# Patient Record
Sex: MALE | Race: WHITE
[De-identification: names, ages, dates, MRNs, and addresses within clinical notes are randomized per-mention and may not be internally consistent; named-entity substitution may affect disease eponyms.]

---

## 2019-09-14 ENCOUNTER — HOSPITAL ENCOUNTER (EMERGENCY)
Dept: HOSPITAL 97 - ER | Age: 54
Discharge: HOME | End: 2019-09-14
Payer: SELF-PAY

## 2019-09-14 VITALS — DIASTOLIC BLOOD PRESSURE: 82 MMHG | SYSTOLIC BLOOD PRESSURE: 119 MMHG

## 2019-09-14 VITALS — OXYGEN SATURATION: 98 %

## 2019-09-14 VITALS — TEMPERATURE: 99.6 F

## 2019-09-14 DIAGNOSIS — J40: Primary | ICD-10-CM

## 2019-09-14 PROCEDURE — 99284 EMERGENCY DEPT VISIT MOD MDM: CPT

## 2019-09-14 PROCEDURE — 71046 X-RAY EXAM CHEST 2 VIEWS: CPT

## 2019-09-14 PROCEDURE — 87804 INFLUENZA ASSAY W/OPTIC: CPT

## 2019-09-14 NOTE — ER
Nurse's Notes                                                                                     

 [facility]                                                                 

Name: Demarco Monroy                                                                            

Age: 54 yrs                                                                                       

Sex: Male                                                                                         

: 1965                                                                                   

MRN: [id number]                                                                                   

Arrival Date: 2019                                                                          

Time: 09:01                                                                                       

Account#: [id number]                                                                            

Bed 7                                                                                             

Private MD:                                                                                       

Diagnosis: Bronchitis, not specified as acute or chronic                                          

                                                                                                  

Presentation:                                                                                     

                                                                                             

09:08 Presenting complaint: Patient states: Nonproductive cough, SOB, headache, and pain with hb  

      cough x 1 week. Transition of care: patient was not received from another setting of        

      care. Onset of symptoms was 2019. Risk Assessment: Do you want to hurt        

      yourself or someone else? Patient reports no desire to harm self or others. Initial         

      Sepsis Screen: Does the patient meet any 2 criteria? No. Patient's initial sepsis           

      screen is negative. Does the patient have a suspected source of infection? No.              

      Patient's initial sepsis screen is negative. Care prior to arrival: None.                   

09:08 Method Of Arrival: Ambulatory                                                           hb  

09:08 Acuity: JUDIT 3                                                                           hb  

                                                                                                  

Historical:                                                                                       

- Allergies:                                                                                      

:14 No Known Allergies;                                                                     hb  

- Home Meds:                                                                                      

:14 None [Active];                                                                          hb  

- PMHx:                                                                                           

09:14 None;                                                                                   hb  

                                                                                                  

- Immunization history:: Adult Immunizations.                                                     

- Social history:: Smoking status: .                                                              

- Ebola Screening: : Patient denies travel to an Ebola-affected area in the 21 days               

  before illness onset.                                                                           

                                                                                                  

                                                                                                  

Screenin:04 Abuse screen: Denies threats or abuse. Nutritional screening: No deficits noted.        tw2 

      Tuberculosis screening: No symptoms or risk factors identified. Fall Risk None              

      identified.                                                                                 

                                                                                                  

Assessment:                                                                                       

09:05 General: Appears in no apparent distress. Behavior is calm, cooperative, appropriate    tw2 

      for age. Pain: Denies pain. Neuro: Level of Consciousness is awake, alert, obeys            

      commands, Oriented to person, place, time, situation. Cardiovascular: Heart tones S1 S2     

      Patient's skin is warm and dry. Respiratory: Reports cough that is non-productive,          

      Airway is patent Respiratory effort is even, unlabored, Respiratory pattern is regular,     

      symmetrical, Breath sounds are clear bilaterally. GI: No signs and/or symptoms were         

      reported involving the gastrointestinal system. Abdomen is round non-distended, Bowel       

      sounds present X 4 quads. : No signs and/or symptoms were reported regarding the          

      genitourinary system. EENT: No signs and/or symptoms were reported regarding the EENT       

      system. Derm: No signs and/or symptoms reported regarding the dermatologic system.          

      Musculoskeletal: Range of motion: intact in all extremities.                                

09:14 Reassessment: provider at bedside at this time.                                         tw2 

10:27 Reassessment: Patient appears in no apparent distress at this time. No changes from     tw2 

      previously documented assessment. Patient and/or family updated on plan of care and         

      expected duration. Pain level reassessed. Patient is alert, oriented x 3, equal             

      unlabored respirations, skin warm/dry/pink.                                                 

                                                                                                  

Vital Signs:                                                                                      

09:02  / 76; Pulse 78; Resp 18; Temp 99.6(TE); Pulse Ox 95% on R/A; Weight 113.4 kg;    hb  

      Height 5 ft. 10 in. (177.80 cm); Pain 5/10;                                                 

10:27 Resp 18; Pulse Ox 98% on R/A;                                                           tw2 

10:41  / 82; Pulse 89; Resp 16; Pulse Ox 98% ;                                          sv  

09:02 Body Mass Index 35.87 (113.40 kg, 177.80 cm)                                            hb  

                                                                                                  

ED Course:                                                                                        

09:01 Patient arrived in ED.                                                                  rg4 

09:03 Aleksandar Raymundo NP is PHCP.                                                           pm1 

09:03 Nghia Church MD is Attending Physician.                                              pm1 

09:04 Stefania Benson, LUIZ is Primary Nurse.                                                        tw2 

09:04 Bed in low position. Call light in reach. Adult w/ patient.                             tw2 

09:04 Arm band placed on.                                                                     tw2 

09:14 Triage completed.                                                                       hb  

09:45 Flu Sent.                                                                               tw2 

10:19 Chest Pa And Lat (2 Views) XRAY In Process Unspecified.                                 EDMS

11:00 Awaiting: consultation with provider regarding results and diagnoses at this time.      tw2 

11:00 No provider procedures requiring assistance completed. Patient did not have IV access   tw2 

      during this emergency room visit.                                                           

                                                                                                  

Administered Medications:                                                                         

09:42 Drug: Tussionex Pennkinetic ER 5 ml Route: PO;                                          tw2 

10:36 Follow up: Response: No adverse reaction; Marked relief of symptoms                     tw2 

09:44 Drug: Albuterol 2.5 mg Route: Inhalation;                                               tw2 

10:36 Follow up: Response: No adverse reaction                                                tw2 

11:00 Drug: predniSONE 60 mg Route: PO;                                                       tw2 

11:06 Follow up: Response: No adverse reaction                                                tw2 

                                                                                                  

                                                                                                  

Outcome:                                                                                          

10:51 Discharge ordered by MD.                                                                pm1 

11:00 Discharged to home ambulatory, with significant other.                                  tw2 

11:00 Condition: stable                                                                           

11:00 Discharge instructions given to patient, significant other, Instructed on discharge         

      instructions, follow up and referral plans. medication usage, Demonstrated                  

      understanding of instructions, follow-up care, medications, Prescriptions given X 3.        

11:05 Patient left the ED.                                                                    2 

                                                                                                  

Signatures:                                                                                       

Dispatcher [employer]                           EDMS                                                 

Trudi Garcia RN                    RN   Aleksandar Vinson, NP                    NP   pm1                                                  

Destiny Johnson RN                     RN   Stefania Fernández RN                          RN   tw2                                                  

[place] Connell                                 rg4                                                  

                                                                                                  

**************************************************************************************************

## 2019-09-14 NOTE — RAD REPORT
EXAM DESCRIPTION:  Agusto Ham And Lat (2 Views)9/14/2019 10:18 am

 

CLINICAL HISTORY:  Cough

 

COMPARISON:  None

 

FINDINGS:   Areas of subsegmental atelectasis are present within the left lung base. Remainder lungs 
appear clear. Heart is normal size

## 2019-09-14 NOTE — EDPHYS
Physician Documentation                                                                           

 [facility]                                                                 

Name: Demarco Monroy                                                                            

Age: 54 yrs                                                                                       

Sex: Male                                                                                         

: 1965                                                                                   

MRN: [id number]                                                                                   

Arrival Date: 2019                                                                          

Time: 09:01                                                                                       

Account#: [id number]                                                                            

Bed 7                                                                                             

Private MD:                                                                                       

ED Physician Nghia Church                                                                       

HPI:                                                                                              

                                                                                             

09:44 This 54 yrs old  Male presents to ER via Ambulatory with complaints of Cough.  pm1 

09:44 The patient or guardian reports cough. Onset: The symptoms/episode began/occurred 7     pm1 

      day(s) ago. Severity of symptoms: in the emergency department the symptoms are actually     

      worse. Modifying factors: The symptoms are alleviated by nothing, the symptoms are          

      aggravated by nothing. Associated signs and symptoms: Pertinent positives: wheezing,        

      Pertinent negatives: chest pain, fever, sore throat. The patient has experienced a          

      previous episode, approximately 1 years ago. The patient has not recently seen a            

      physician.                                                                                  

                                                                                                  

Historical:                                                                                       

- Allergies:                                                                                      

09:14 No Known Allergies;                                                                     hb  

- Home Meds:                                                                                      

: None [Active];                                                                          hb  

- PMHx:                                                                                           

:14 None;                                                                                   hb  

                                                                                                  

- Immunization history:: Adult Immunizations.                                                     

- Social history:: Smoking status: .                                                              

- Ebola Screening: : Patient denies travel to an Ebola-affected area in the 21 days               

  before illness onset.                                                                           

                                                                                                  

                                                                                                  

ROS:                                                                                              

09:44 Constitutional: Negative for fever, chills, and weight loss, Eyes: Negative for injury, pm1 

      pain, redness, and discharge, ENT: Negative for injury, pain, and discharge, Neck:          

      Negative for injury, pain, and swelling, Cardiovascular: Negative for chest pain,           

      palpitations, and edema.                                                                    

09:44 Abdomen/GI: Negative for abdominal pain, nausea, vomiting, diarrhea, and constipation,      

      Back: Negative for injury and pain, MS/Extremity: Negative for injury and deformity,        

      Skin: Negative for injury, rash, and discoloration, Neuro: Negative for headache,           

      weakness, numbness, tingling, and seizure.                                                  

09:44 Respiratory: Positive for cough, Negative for shortness of breath, sputum production,       

      wheezing.                                                                                   

                                                                                                  

Exam:                                                                                             

09:44 Constitutional:  This is a well developed, well nourished patient who is awake, alert,  pm1 

      and in no acute distress. Head/Face:  Normocephalic, atraumatic. Neck:  Trachea             

      midline, no thyromegaly or masses palpated, and no cervical lymphadenopathy.  Supple,       

      full range of motion without nuchal rigidity, or vertebral point tenderness.  No            

      Meningismus. Chest/axilla:  Normal chest wall appearance and motion.  Nontender with no     

      deformity.  No lesions are appreciated. Cardiovascular:  Regular rate and rhythm with a     

      normal S1 and S2.  No gallops, murmurs, or rubs.  Normal PMI, no JVD.  No pulse             

      deficits.                                                                                   

09:44 Abdomen/GI:  Soft, non-tender, with normal bowel sounds.  No distension or tympany.  No     

      guarding or rebound.  No evidence of tenderness throughout. Back:  No spinal                

      tenderness.  No costovertebral tenderness.  Full range of motion. Skin:  Warm, dry with     

      normal turgor.  Normal color with no rashes, no lesions, and no evidence of cellulitis.     

      MS/ Extremity:  Pulses equal, no cyanosis.  Neurovascular intact.  Full, normal range       

      of motion.                                                                                  

09:44 Respiratory: the patient does not display signs of respiratory distress,  Respirations:     

      normal, Breath sounds: wheezing: that is mild, is heard in the  left posterior upper        

      lobe.                                                                                       

09:44 Neuro: Orientation: is normal, Motor: is normal, moves all fours, Sensation: is normal,     

      no obvious gross deficits, Gait: is steady, at a normal pace, without difficulty.           

                                                                                                  

Vital Signs:                                                                                      

09:02  / 76; Pulse 78; Resp 18; Temp 99.6(TE); Pulse Ox 95% on R/A; Weight 113.4 kg;    hb  

      Height 5 ft. 10 in. (177.80 cm); Pain 5/10;                                                 

10:27 Resp 18; Pulse Ox 98% on R/A;                                                           tw2 

10:41  / 82; Pulse 89; Resp 16; Pulse Ox 98% ;                                          sv  

09:02 Body Mass Index 35.87 (113.40 kg, 177.80 cm)                                            hb  

                                                                                                  

MDM:                                                                                              

09:10 Patient medically screened.                                                             pm1 

10:50 Data reviewed: vital signs. Data interpreted: Pulse oximetry: on room air is 98 %.      pm1 

      Interpretation: normal. Counseling: I had a detailed discussion with the patient and/or     

      guardian regarding: the historical points, exam findings, and any diagnostic results        

      supporting the discharge/admit diagnosis, lab results, radiology results, the need for      

      outpatient follow up, to return to the emergency department if symptoms worsen or           

      persist or if there are any questions or concerns that arise at home.                       

                                                                                                  

                                                                                             

09:19 Order name: Flu; Complete Time: 10:21                                                   pm1 

                                                                                             

09:19 Order name: Chest Pa And Lat (2 Views) XRAY; Complete Time: 10:50                       pm1 

                                                                                                  

Administered Medications:                                                                         

09:42 Drug: Tussionex Pennkinetic ER 5 ml Route: PO;                                          tw2 

10:36 Follow up: Response: No adverse reaction; Marked relief of symptoms                     tw2 

09:44 Drug: Albuterol 2.5 mg Route: Inhalation;                                               tw2 

10:36 Follow up: Response: No adverse reaction                                                tw2 

11:00 Drug: predniSONE 60 mg Route: PO;                                                       tw2 

11:06 Follow up: Response: No adverse reaction                                                tw2 

                                                                                                  

                                                                                                  

Disposition:                                                                                      

19 10:51 Discharged to Home. Impression: Bronchitis, not specified as acute or chronic.     

- Condition is Stable.                                                                            

- Discharge Instructions: Acute Bronchitis, Adult, How to Use an Inhaler, Cough, Adult.           

- Prescriptions for Medrol (Yaron) 4 mg Oral Tablets, Dose Pack - take 1 tablet by ORAL             

  route as directed - follow package instructions; 1 packet. Albuterol Sulfate 90                 

  mcg/actuation - inhale 1-2 puff by INHALATION route every 4-6 hours; 1 Inhaler.                 

  Guaifenesin AC 10- 100 mg/5 mL Oral Liquid - take 10 milliliter by ORAL route every 4           

  hours As needed; 240 milliliter.                                                                

- Medication Reconciliation Form, Thank You Letter, Antibiotic Education, Prescription            

  Opioid Use, Work release form form.                                                             

- Follow up: Emergency Department; When: As needed; Reason: Worsening of condition.               

  Follow up: Private Physician; When: 2 - 3 days; Reason: Recheck today's complaints,             

  Continuance of care, Re-evaluation by your physician.                                           

- Problem is new.                                                                                 

- Symptoms have improved.                                                                         

                                                                                                  

                                                                                                  

                                                                                                  

Addendum:                                                                                         

2019                                                                                        

     09:50 Co-signature as Attending Physician, Nghia Church MD I agree with the assessment and   k
dr

           plan of care.                                                                          

                                                                                                  

Signatures:                                                                                       

Dispatcher [employer]                           EDMS                                                 

Nghia Church MD MD   [facility]                                                  

Aleksandar Raymundo NP                    NP   pm1                                                  

Destiny Johnson, LUIZ                     RN                                                      

Stefania Benson RN                          RN   tw2                                                  

                                                                                                  

Corrections: (The following items were deleted from the chart)                                    

                                                                                             

11:05 10:51 2019 10:51 Discharged to Home. Impression: Bronchitis, not specified as     tw2 

      acute or chronic. Condition is Stable. Forms are Work release form, Medication              

      Reconciliation Form, Thank You Letter, Antibiotic Education, Prescription Opioid Use.       

      Follow up: Emergency Department; When: As needed; Reason: Worsening of condition.           

      Follow up: Private Physician; When: 2 - 3 days; Reason: Recheck today's complaints,         

      Continuance of care, Re-evaluation by your physician. Problem is new. Symptoms have         

      improved. pm1                                                                               

                                                                                                  

**************************************************************************************************